# Patient Record
Sex: FEMALE | Race: WHITE | NOT HISPANIC OR LATINO
[De-identification: names, ages, dates, MRNs, and addresses within clinical notes are randomized per-mention and may not be internally consistent; named-entity substitution may affect disease eponyms.]

---
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
## 2020-04-14 NOTE — HISTORY OF PRESENT ILLNESS
[FreeTextEntry1] : 18 y/o female s/p bilateral breast reduction on 5/22/19. She is doing well and denies any issues or complaints.

## 2020-04-14 NOTE — ASSESSMENT
[FreeTextEntry1] : 20 y/o female s/p bilateral breast reduction on 5/22/19. She is doing well and denies any issues or complaints. \par Return to office in 2 weeks.\par

## 2020-04-14 NOTE — PHYSICAL EXAM
[NI] : Normal [de-identified] : bilateral breast incisions healing well. Soft, non-tender. No signs of infection.

## 2020-07-06 NOTE — H&P PST ADULT - NSCAGESTDRUGCUTDN_GEN_A_CORE_SD
28 year old female  EDC 20 at 40.6 weeks who presents from office  noted to have oligo   denied any lof vb  feels good fetal movements   state  GBS negative    EFW 7#   denied any ap issues denied any fetal issues     med hx denied   surg hx denied  NKDA   OB hx 2018 FT   7.6#
no

## 2020-07-09 ENCOUNTER — TRANSCRIPTION ENCOUNTER (OUTPATIENT)
Age: 20
End: 2020-07-09

## 2021-07-22 ENCOUNTER — TRANSCRIPTION ENCOUNTER (OUTPATIENT)
Age: 21
End: 2021-07-22

## 2021-10-19 ENCOUNTER — TRANSCRIPTION ENCOUNTER (OUTPATIENT)
Age: 21
End: 2021-10-19

## 2021-10-20 ENCOUNTER — EMERGENCY (EMERGENCY)
Facility: HOSPITAL | Age: 21
LOS: 1 days | Discharge: HOME | End: 2021-10-20
Attending: EMERGENCY MEDICINE
Payer: COMMERCIAL

## 2021-10-20 VITALS
RESPIRATION RATE: 18 BRPM | OXYGEN SATURATION: 96 % | TEMPERATURE: 99 F | SYSTOLIC BLOOD PRESSURE: 127 MMHG | DIASTOLIC BLOOD PRESSURE: 80 MMHG | WEIGHT: 164.02 LBS | HEART RATE: 120 BPM

## 2021-10-20 DIAGNOSIS — Z98.890 OTHER SPECIFIED POSTPROCEDURAL STATES: Chronic | ICD-10-CM

## 2021-10-20 LAB
HETEROPH AB TITR SER AGGL: POSITIVE
S PYO DNA THROAT QL NAA+PROBE: SIGNIFICANT CHANGE UP

## 2021-10-20 PROCEDURE — 36415 COLL VENOUS BLD VENIPUNCTURE: CPT

## 2021-10-20 PROCEDURE — 87651 STREP A DNA AMP PROBE: CPT

## 2021-10-20 PROCEDURE — 99283 EMERGENCY DEPT VISIT LOW MDM: CPT

## 2021-10-20 PROCEDURE — 87798 DETECT AGENT NOS DNA AMP: CPT

## 2021-10-20 PROCEDURE — 86308 HETEROPHILE ANTIBODY SCREEN: CPT

## 2021-10-20 NOTE — ED ADULT TRIAGE NOTE - CHIEF COMPLAINT QUOTE
Patient states that she has been having fevers x 10days. Pt states that she has had multiple different tests done but they are all negative. Tmax 103.9. Pt states that she took Advil at 5am. Pt c/o cough, sore throat and bilateral axillary pain

## 2021-10-20 NOTE — ED STATDOCS - WET READ LAUNCH FT
Detail Level: Detailed
Depth Of Biopsy: dermis
Was A Bandage Applied: Yes
Size Of Lesion In Cm: 0
Anticipated Plan (Based On Presumed Biopsy Results): If positive 15
Biopsy Type: H and E
Biopsy Method: 15 blade
Anesthesia Type: 1% lidocaine with 1:100,000 epinephrine
Anesthesia Volume In Cc (Will Not Render If 0): 0.5
Hemostasis: Aluminum Chloride
Wound Care: Vaseline
Dressing: Band-Aid
There are no Wet Read(s) to document.
Destruction After The Procedure: No
Type Of Destruction Used: Curettage
Curettage Text: The wound bed was treated with curettage after the biopsy was performed.
Cryotherapy Text: The wound bed was treated with cryotherapy after the biopsy was performed.
Electrodesiccation Text: The wound bed was treated with electrodesiccation after the biopsy was performed.
Electrodesiccation And Curettage Text: The wound bed was treated with electrodesiccation and curettage after the biopsy was performed.
Silver Nitrate Text: The wound bed was treated with silver nitrate after the biopsy was performed.
Lab: 6
Lab Facility: [id number]
Consent: Written consent was obtained and risks were reviewed including but not limited to scarring, infection, bleeding, scabbing, incomplete removal, nerve damage and allergy to anesthesia.
Post-Care Instructions: POST CARE: \\n- Your bandage should remain dry until the following morning. \\n- Clean the site each morning with soap and water. Apply Vaseline and a fresh bandaid each day until the site heals.
Notification Instructions: Patient instructed to call the office if not contacted with biopsy results within 2 weeks.
Billing Type: Third-Party Bill
Information: Selecting Yes will display possible errors in your note based on the variables you have selected. This validation is only offered as a suggestion for you. PLEASE NOTE THAT THE VALIDATION TEXT WILL BE REMOVED WHEN YOU FINALIZE YOUR NOTE. IF YOU WANT TO FAX A PRELIMINARY NOTE YOU WILL NEED TO TOGGLE THIS TO 'NO' IF YOU DO NOT WANT IT IN YOUR FAXED NOTE.

## 2021-10-20 NOTE — ED STATDOCS - PATIENT PORTAL LINK FT
You can access the FollowMyHealth Patient Portal offered by [facility] by registering at the following website: [email or web address]. By joining [employer]’s FollowMyHealth portal, you will also be able to view your health information using other applications (apps) compatible with our system.

## 2021-10-20 NOTE — ED STATDOCS - OBJECTIVE STATEMENT
22 y/o female with no PMHx c/o fever. Pt states she had fever of 103.8 at 05:00 took 2 Advil. Pt states she had negative rapid strep, mono, flu, and covid yesterday, also had PCR covid sent. Pt states is fully vaccinated against fever. Pt states starting 10 days ago she had sinus pressure, nasal congestion, ear pressure has been using nasal spray and OTC decongestant. Pt states her roommate had bronchitis prior her symptoms. Pt went back to [facility] 5 days ago due to continued fever and was given z-pack.

## 2021-10-20 NOTE — ED STATDOCS - NSFOLLOWUPINSTRUCTIONS_ED_ALL_ED_FT
Rest, lots of fluids  Increase Advil to 600 mg every 6 hrs with food and may alternate with ES Tylenol 1000 mg every 6hrs  Follow up next 2-3 days  Return sooner for any problems      Viral Illness, Pediatric  Viruses are tiny germs that can get into a person's body and cause illness. There are many different types of viruses, and they cause many types of illness. Viral illness in children is very common. A viral illness can cause fever, sore throat, cough, rash, or diarrhea. Most viral illnesses that affect children are not serious. Most go away after several days without treatment.    The most common types of viruses that affect children are:    Cold and flu viruses.  Stomach viruses.  Viruses that cause fever and rash. These include illnesses such as measles, rubella, roseola, fifth disease, and chicken pox.    What are the causes?  Many types of viruses can cause illness. Viruses invade cells in your child's body, multiply, and cause the infected cells to malfunction or die. When the cell dies, it releases more of the virus. When this happens, your child develops symptoms of the illness, and the virus continues to spread to other cells. If the virus takes over the function of the cell, it can cause the cell to divide and grow out of control, as is the case when a virus causes cancer.    Different viruses get into the body in different ways. Your child is most likely to catch a virus from being exposed to another person who is infected with a virus. This may happen at home, at school, or at . Your child may get a virus by:    Breathing in droplets that have been coughed or sneezed into the air by an infected person. Cold and flu viruses, as well as viruses that cause fever and rash, are often spread through these droplets.  Touching anything that has been contaminated with the virus and then touching his or her nose, mouth, or eyes. Objects can be contaminated with a virus if:    They have droplets on them from a recent cough or sneeze of an infected person.  They have been in contact with the vomit or stool (feces) of an infected person. Stomach viruses can spread through vomit or stool.    Eating or drinking anything that has been in contact with the virus.  Being bitten by an insect or animal that carries the virus.  Being exposed to blood or fluids that contain the virus, either through an open cut or during a transfusion.    What are the signs or symptoms?  Symptoms vary depending on the type of virus and the location of the cells that it invades. Common symptoms of the main types of viral illnesses that affect children include:    Cold and flu viruses     Fever.  Sore throat.  Aches and headache.  Stuffy nose.  Earache.  Cough.  Stomach viruses     Fever.  Loss of appetite.  Vomiting.  Stomachache.  Diarrhea.  Fever and rash viruses     Fever.  Swollen glands.  Rash.  Runny nose.  How is this treated?  Most viral illnesses in children go away within 3?10 days. In most cases, treatment is not needed. Your child's health care provider may suggest over-the-counter medicines to relieve symptoms.    A viral illness cannot be treated with antibiotic medicines. Viruses live inside cells, and antibiotics do not get inside cells. Instead, antiviral medicines are sometimes used to treat viral illness, but these medicines are rarely needed in children.    Many childhood viral illnesses can be prevented with vaccinations (immunization shots). These shots help prevent flu and many of the fever and rash viruses.    Follow these instructions at home:  Medicines     Give over-the-counter and prescription medicines only as told by your child's health care provider. Cold and flu medicines are usually not needed. If your child has a fever, ask the health care provider what over-the-counter medicine to use and what amount (dosage) to give.  Do not give your child aspirin because of the association with Reye syndrome.  If your child is older than 4 years and has a cough or sore throat, ask the health care provider if you can give cough drops or a throat lozenge.  Do not ask for an antibiotic prescription if your child has been diagnosed with a viral illness. That will not make your child's illness go away faster. Also, frequently taking antibiotics when they are not needed can lead to antibiotic resistance. When this develops, the medicine no longer works against the bacteria that it normally fights.  Eating and drinking     Image   If your child is vomiting, give only sips of clear fluids. Offer sips of fluid frequently. Follow instructions from your child's health care provider about eating or drinking restrictions.  If your child is able to drink fluids, have the child drink enough fluid to keep his or her urine clear or pale yellow.  General instructions     Make sure your child gets a lot of rest.  If your child has a stuffy nose, ask your child's health care provider if you can use salt-water nose drops or spray.  If your child has a cough, use a cool-mist humidifier in your child's room.  If your child is older than 1 year and has a cough, ask your child's health care provider if you can give teaspoons of honey and how often.  Keep your child home and rested until symptoms have cleared up. Let your child return to normal activities as told by your child's health care provider.  Keep all follow-up visits as told by your child's health care provider. This is important.  How is this prevented?  ImageTo reduce your child's risk of viral illness:    Teach your child to wash his or her hands often with soap and water. If soap and water are not available, he or she should use hand .  Teach your child to avoid touching his or her nose, eyes, and mouth, especially if the child has not washed his or her hands recently.  If anyone in the household has a viral infection, clean all household surfaces that may have been in contact with the virus. Use soap and hot water. You may also use diluted bleach.  Keep your child away from people who are sick with symptoms of a viral infection.  Teach your child to not share items such as toothbrushes and water bottles with other people.  Keep all of your child's immunizations up to date.  Have your child eat a healthy diet and get plenty of rest.    Contact a health care provider if:  Your child has symptoms of a viral illness for longer than expected. Ask your child's health care provider how long symptoms should last.  Treatment at home is not controlling your child's symptoms or they are getting worse.  Get help right away if:  Your child who is younger than 3 months has a temperature of 100°F (38°C) or higher.  Your child has vomiting that lasts more than 24 hours.  Your child has trouble breathing.  Your child has a severe headache or has a stiff neck.  This information is not intended to replace advice given to you by your health care provider. Make sure you discuss any questions you have with your health care provider.

## 2021-10-20 NOTE — ED STATDOCS - PROGRESS NOTE DETAILS
Strep,. RVP, and monospot sent.  Encouraged pt to increase Advil to 600 mg every 6 hrs and alternate with Tylenol and f/u as outpt

## 2021-10-21 NOTE — ED POST DISCHARGE NOTE - ADDITIONAL DOCUMENTATION
Spoke with PT advised + mono and no athletics. RVP status shows "pending collection". PT will return to ED today for RVP

## 2021-10-22 ENCOUNTER — EMERGENCY (EMERGENCY)
Facility: HOSPITAL | Age: 21
LOS: 1 days | Discharge: HOME | End: 2021-10-22
Payer: COMMERCIAL

## 2021-10-22 VITALS
HEIGHT: 65 IN | WEIGHT: 164.91 LBS | HEART RATE: 103 BPM | TEMPERATURE: 99 F | DIASTOLIC BLOOD PRESSURE: 62 MMHG | OXYGEN SATURATION: 97 % | RESPIRATION RATE: 18 BRPM | SYSTOLIC BLOOD PRESSURE: 113 MMHG

## 2021-10-22 DIAGNOSIS — Z98.890 OTHER SPECIFIED POSTPROCEDURAL STATES: Chronic | ICD-10-CM

## 2021-10-22 LAB
RAPID RVP RESULT: DETECTED
RV+EV RNA SPEC QL NAA+PROBE: DETECTED
SARS-COV-2 RNA SPEC QL NAA+PROBE: SIGNIFICANT CHANGE UP

## 2021-10-22 PROCEDURE — 99284 EMERGENCY DEPT VISIT MOD MDM: CPT

## 2021-10-22 PROCEDURE — 0225U NFCT DS DNA&RNA 21 SARSCOV2: CPT

## 2021-10-22 PROCEDURE — 99283 EMERGENCY DEPT VISIT LOW MDM: CPT

## 2021-10-22 NOTE — ED PROVIDER NOTE - NSICDXPASTMEDICALHX_GEN_ALL_CORE_FT
PAST MEDICAL HISTORY:  Cervicalgia     Dorsalgia     Hypertrophy of breast     No pertinent past medical history

## 2021-10-22 NOTE — ED ADULT TRIAGE NOTE - CHIEF COMPLAINT QUOTE
pt states that she was her x2 days and was called to come back because she had to retake "a test" unsure which test pt states that she was here x2 days and was called to come back because she had to retake "a test" unsure which test

## 2021-10-22 NOTE — ED PROVIDER NOTE - PATIENT PORTAL LINK FT
You can access the FollowMyHealth Patient Portal offered by [facility] by registering at the following website: [email or web address]. By joining [facility]’s FollowMyHealth portal, you will also be able to view your health information using other applications (apps) compatible with our system.

## 2021-10-22 NOTE — ED PROVIDER NOTE - OBJECTIVE STATEMENT
pt was eval in Ed and dx with mono, presents for repeat RVP swab. Complaining of dry cough, denies SOB/wheezing/CP.

## 2021-10-23 PROBLEM — Z78.9 OTHER SPECIFIED HEALTH STATUS: Chronic | Status: ACTIVE | Noted: 2021-10-20

## 2022-02-11 NOTE — ED STATDOCS - ENMT, MLM
Your presenting symptoms of right wrist pain for the last 4 to 5 days are likely nonspecific sprain versus tendinitis related.  X-ray right wrist done here on initial review is negative for any acute findings.  Recommend supportive care for now.  Wrist splint is placed for support.  May continue taking over-the-counter ibuprofen 600 mg with meals every 8 hours as needed for pain.  Advised to rest, avoid repeat wrist movements or doing any strenuous activity till symptoms resolve, advance activity gradually as tolerated.  Follow with primary care physician or come back if not better in few days or sooner if worsening      Patient Education     Wrist Sprain  A sprain is an injury to the ligaments or capsule that holds a joint together. There are no broken bones. Most sprains take about 3 to 6 weeks to heal. If it a severe sprain where the ligament is completely torn, it can take months to recover.  Most wrist sprains are treated with a splint, wrist brace, or elastic wrap for support. Severe sprains may require surgery.  Home care  · Keep your arm elevated to reduce pain and swelling. This is very important during the first 48 hours.  · Apply an ice pack over the injured area for 15 to 20 minutes every 3 to 6 hours. You should do this for the first 24 to 48 hours. You can make an ice pack by filling a plastic bag that seals at the top with ice cubes and then wrapping it with a thin towel. Continue to use ice packs for relief of pain and swelling as needed. As the ice melts, be careful to avoid getting your wrap, splint, or cast wet. After 48 hours, apply heat (warm shower or warm bath) for 15 to 20 minutes several times a day, or alternate ice and heat.   · You may use over-the-counter pain medicine to control pain, unless another pain medicine was prescribed. If you have chronic liver or kidney disease or ever had a stomach ulcer or gastrointestinal bleeding, talk with your doctor before using these medicines.  · If  you were given a splint or brace, wear it for the time advised by your doctor.  Follow-up care  Follow up with your healthcare provider, or as advised. Any X-rays you had today don’t show any broken bones, breaks, or fractures. Sometimes fractures don’t show up on the first X-ray. Bruises and sprains can sometimes hurt as much as a fracture. These injuries can take time to heal completely. If your symptoms don’t improve or they get worse, talk with your doctor. You may need a repeat X-ray. If X-rays were taken, you will be told of any new findings that may affect your care.  When to seek medical advice  Call your healthcare provider right away if any of these occur:  · Pain or swelling increases  · Fingers or hand becomes cold, blue, numb, or tingly   Melisa last reviewed this educational content on 5/1/2018  © 3095-1864 [employer]. All rights reserved. This information is not intended as a substitute for professional medical care. Always follow your healthcare professional's instructions.            posterior cervical adenopathy, enlarged tonsil no exudates mildly erythematous

## 2022-05-20 ENCOUNTER — TRANSCRIPTION ENCOUNTER (OUTPATIENT)
Age: 22
End: 2022-05-20

## 2022-05-21 ENCOUNTER — TRANSCRIPTION ENCOUNTER (OUTPATIENT)
Age: 22
End: 2022-05-21

## 2022-05-21 ENCOUNTER — INPATIENT (INPATIENT)
Facility: HOSPITAL | Age: 22
LOS: 0 days | Discharge: HOME | DRG: 743 | End: 2022-05-21
Attending: OBSTETRICS & GYNECOLOGY | Admitting: OBSTETRICS & GYNECOLOGY
Payer: COMMERCIAL

## 2022-05-21 ENCOUNTER — RESULT REVIEW (OUTPATIENT)
Age: 22
End: 2022-05-21

## 2022-05-21 VITALS
RESPIRATION RATE: 16 BRPM | HEART RATE: 85 BPM | OXYGEN SATURATION: 100 % | DIASTOLIC BLOOD PRESSURE: 60 MMHG | SYSTOLIC BLOOD PRESSURE: 119 MMHG

## 2022-05-21 VITALS
DIASTOLIC BLOOD PRESSURE: 71 MMHG | HEART RATE: 83 BPM | SYSTOLIC BLOOD PRESSURE: 117 MMHG | HEIGHT: 65 IN | RESPIRATION RATE: 22 BRPM | OXYGEN SATURATION: 100 %

## 2022-05-21 DIAGNOSIS — Z98.890 OTHER SPECIFIED POSTPROCEDURAL STATES: Chronic | ICD-10-CM

## 2022-05-21 DIAGNOSIS — N83.519 TORSION OF OVARY AND OVARIAN PEDICLE, UNSPECIFIED SIDE: ICD-10-CM

## 2022-05-21 LAB
ALBUMIN SERPL ELPH-MCNC: 4.8 G/DL — SIGNIFICANT CHANGE UP (ref 3.3–5.2)
ALP SERPL-CCNC: 75 U/L — SIGNIFICANT CHANGE UP (ref 40–120)
ALT FLD-CCNC: 49 U/L — HIGH
ANION GAP SERPL CALC-SCNC: 18 MMOL/L — HIGH (ref 5–17)
APTT BLD: 26.8 SEC — LOW (ref 27.5–35.5)
AST SERPL-CCNC: 36 U/L — HIGH
BASOPHILS # BLD AUTO: 0.02 K/UL — SIGNIFICANT CHANGE UP (ref 0–0.2)
BASOPHILS NFR BLD AUTO: 0.2 % — SIGNIFICANT CHANGE UP (ref 0–2)
BILIRUB SERPL-MCNC: 0.5 MG/DL — SIGNIFICANT CHANGE UP (ref 0.4–2)
BLD GP AB SCN SERPL QL: SIGNIFICANT CHANGE UP
BUN SERPL-MCNC: 17.7 MG/DL — SIGNIFICANT CHANGE UP (ref 8–20)
CALCIUM SERPL-MCNC: 9.4 MG/DL — SIGNIFICANT CHANGE UP (ref 8.6–10.2)
CHLORIDE SERPL-SCNC: 100 MMOL/L — SIGNIFICANT CHANGE UP (ref 98–107)
CO2 SERPL-SCNC: 20 MMOL/L — LOW (ref 22–29)
CREAT SERPL-MCNC: 0.67 MG/DL — SIGNIFICANT CHANGE UP (ref 0.5–1.3)
EGFR: 127 ML/MIN/1.73M2 — SIGNIFICANT CHANGE UP
EOSINOPHIL # BLD AUTO: 0.03 K/UL — SIGNIFICANT CHANGE UP (ref 0–0.5)
EOSINOPHIL NFR BLD AUTO: 0.2 % — SIGNIFICANT CHANGE UP (ref 0–6)
GLUCOSE SERPL-MCNC: 131 MG/DL — HIGH (ref 70–99)
HCG SERPL-ACNC: <4 MIU/ML — SIGNIFICANT CHANGE UP
HCT VFR BLD CALC: 42 % — SIGNIFICANT CHANGE UP (ref 34.5–45)
HGB BLD-MCNC: 14.5 G/DL — SIGNIFICANT CHANGE UP (ref 11.5–15.5)
IMM GRANULOCYTES NFR BLD AUTO: 0.5 % — SIGNIFICANT CHANGE UP (ref 0–1.5)
INR BLD: 1.2 RATIO — HIGH (ref 0.88–1.16)
LIDOCAIN IGE QN: 20 U/L — LOW (ref 22–51)
LYMPHOCYTES # BLD AUTO: 1.14 K/UL — SIGNIFICANT CHANGE UP (ref 1–3.3)
LYMPHOCYTES # BLD AUTO: 9.3 % — LOW (ref 13–44)
MCHC RBC-ENTMCNC: 29.1 PG — SIGNIFICANT CHANGE UP (ref 27–34)
MCHC RBC-ENTMCNC: 34.5 GM/DL — SIGNIFICANT CHANGE UP (ref 32–36)
MCV RBC AUTO: 84.2 FL — SIGNIFICANT CHANGE UP (ref 80–100)
MONOCYTES # BLD AUTO: 0.75 K/UL — SIGNIFICANT CHANGE UP (ref 0–0.9)
MONOCYTES NFR BLD AUTO: 6.1 % — SIGNIFICANT CHANGE UP (ref 2–14)
NEUTROPHILS # BLD AUTO: 10.22 K/UL — HIGH (ref 1.8–7.4)
NEUTROPHILS NFR BLD AUTO: 83.7 % — HIGH (ref 43–77)
PLATELET # BLD AUTO: 221 K/UL — SIGNIFICANT CHANGE UP (ref 150–400)
POTASSIUM SERPL-MCNC: 3.4 MMOL/L — LOW (ref 3.5–5.3)
POTASSIUM SERPL-SCNC: 3.4 MMOL/L — LOW (ref 3.5–5.3)
PROT SERPL-MCNC: 7.2 G/DL — SIGNIFICANT CHANGE UP (ref 6.6–8.7)
PROTHROM AB SERPL-ACNC: 14 SEC — HIGH (ref 10.5–13.4)
RAPID RVP RESULT: SIGNIFICANT CHANGE UP
RBC # BLD: 4.99 M/UL — SIGNIFICANT CHANGE UP (ref 3.8–5.2)
RBC # FLD: 12.2 % — SIGNIFICANT CHANGE UP (ref 10.3–14.5)
SARS-COV-2 RNA SPEC QL NAA+PROBE: SIGNIFICANT CHANGE UP
SODIUM SERPL-SCNC: 138 MMOL/L — SIGNIFICANT CHANGE UP (ref 135–145)
WBC # BLD: 12.22 K/UL — HIGH (ref 3.8–10.5)
WBC # FLD AUTO: 12.22 K/UL — HIGH (ref 3.8–10.5)

## 2022-05-21 PROCEDURE — 80053 COMPREHEN METABOLIC PANEL: CPT

## 2022-05-21 PROCEDURE — 76830 TRANSVAGINAL US NON-OB: CPT | Mod: 26

## 2022-05-21 PROCEDURE — 86850 RBC ANTIBODY SCREEN: CPT

## 2022-05-21 PROCEDURE — 99285 EMERGENCY DEPT VISIT HI MDM: CPT

## 2022-05-21 PROCEDURE — 88305 TISSUE EXAM BY PATHOLOGIST: CPT | Mod: 26

## 2022-05-21 PROCEDURE — 0225U NFCT DS DNA&RNA 21 SARSCOV2: CPT

## 2022-05-21 PROCEDURE — 58661 LAPAROSCOPY REMOVE ADNEXA: CPT | Mod: GC

## 2022-05-21 PROCEDURE — 84702 CHORIONIC GONADOTROPIN TEST: CPT

## 2022-05-21 PROCEDURE — 99285 EMERGENCY DEPT VISIT HI MDM: CPT | Mod: 25

## 2022-05-21 PROCEDURE — 76830 TRANSVAGINAL US NON-OB: CPT

## 2022-05-21 PROCEDURE — 88305 TISSUE EXAM BY PATHOLOGIST: CPT

## 2022-05-21 PROCEDURE — 86901 BLOOD TYPING SEROLOGIC RH(D): CPT

## 2022-05-21 PROCEDURE — 85025 COMPLETE CBC W/AUTO DIFF WBC: CPT

## 2022-05-21 PROCEDURE — 83690 ASSAY OF LIPASE: CPT

## 2022-05-21 PROCEDURE — 76856 US EXAM PELVIC COMPLETE: CPT | Mod: 26

## 2022-05-21 PROCEDURE — 96375 TX/PRO/DX INJ NEW DRUG ADDON: CPT

## 2022-05-21 PROCEDURE — 96374 THER/PROPH/DIAG INJ IV PUSH: CPT

## 2022-05-21 PROCEDURE — 76856 US EXAM PELVIC COMPLETE: CPT

## 2022-05-21 PROCEDURE — 36415 COLL VENOUS BLD VENIPUNCTURE: CPT

## 2022-05-21 PROCEDURE — 86900 BLOOD TYPING SEROLOGIC ABO: CPT

## 2022-05-21 RX ORDER — KETOROLAC TROMETHAMINE 30 MG/ML
15 SYRINGE (ML) INJECTION ONCE
Refills: 0 | Status: DISCONTINUED | OUTPATIENT
Start: 2022-05-21 | End: 2022-05-21

## 2022-05-21 RX ORDER — MORPHINE SULFATE 50 MG/1
4 CAPSULE, EXTENDED RELEASE ORAL ONCE
Refills: 0 | Status: DISCONTINUED | OUTPATIENT
Start: 2022-05-21 | End: 2022-05-21

## 2022-05-21 RX ORDER — HYDROMORPHONE HYDROCHLORIDE 2 MG/ML
0.5 INJECTION INTRAMUSCULAR; INTRAVENOUS; SUBCUTANEOUS
Refills: 0 | Status: DISCONTINUED | OUTPATIENT
Start: 2022-05-21 | End: 2022-05-21

## 2022-05-21 RX ORDER — ONDANSETRON 8 MG/1
4 TABLET, FILM COATED ORAL ONCE
Refills: 0 | Status: COMPLETED | OUTPATIENT
Start: 2022-05-21 | End: 2022-05-21

## 2022-05-21 RX ORDER — IBUPROFEN 200 MG
1 TABLET ORAL
Qty: 16 | Refills: 0
Start: 2022-05-21 | End: 2022-05-24

## 2022-05-21 RX ORDER — SODIUM CHLORIDE 9 MG/ML
1000 INJECTION, SOLUTION INTRAVENOUS
Refills: 0 | Status: DISCONTINUED | OUTPATIENT
Start: 2022-05-21 | End: 2022-05-21

## 2022-05-21 RX ORDER — ACETAMINOPHEN 500 MG
2 TABLET ORAL
Qty: 32 | Refills: 0
Start: 2022-05-21 | End: 2022-05-24

## 2022-05-21 RX ORDER — ACETAMINOPHEN 500 MG
1000 TABLET ORAL ONCE
Refills: 0 | Status: COMPLETED | OUTPATIENT
Start: 2022-05-21 | End: 2022-05-21

## 2022-05-21 RX ADMIN — MORPHINE SULFATE 4 MILLIGRAM(S): 50 CAPSULE, EXTENDED RELEASE ORAL at 03:58

## 2022-05-21 RX ADMIN — HYDROMORPHONE HYDROCHLORIDE 0.5 MILLIGRAM(S): 2 INJECTION INTRAMUSCULAR; INTRAVENOUS; SUBCUTANEOUS at 08:20

## 2022-05-21 RX ADMIN — HYDROMORPHONE HYDROCHLORIDE 0.5 MILLIGRAM(S): 2 INJECTION INTRAMUSCULAR; INTRAVENOUS; SUBCUTANEOUS at 08:41

## 2022-05-21 RX ADMIN — Medication 15 MILLIGRAM(S): at 04:02

## 2022-05-21 RX ADMIN — Medication 400 MILLIGRAM(S): at 08:39

## 2022-05-21 RX ADMIN — ONDANSETRON 4 MILLIGRAM(S): 8 TABLET, FILM COATED ORAL at 08:45

## 2022-05-21 RX ADMIN — MORPHINE SULFATE 4 MILLIGRAM(S): 50 CAPSULE, EXTENDED RELEASE ORAL at 02:46

## 2022-05-21 RX ADMIN — MORPHINE SULFATE 4 MILLIGRAM(S): 50 CAPSULE, EXTENDED RELEASE ORAL at 05:10

## 2022-05-21 RX ADMIN — ONDANSETRON 4 MILLIGRAM(S): 8 TABLET, FILM COATED ORAL at 02:46

## 2022-05-21 RX ADMIN — ONDANSETRON 4 MILLIGRAM(S): 8 TABLET, FILM COATED ORAL at 05:10

## 2022-05-21 NOTE — ED PROVIDER NOTE - CLINICAL SUMMARY MEDICAL DECISION MAKING FREE TEXT BOX
Pt with known hx of ovarian cyst. Concern for torsion. Will obtain stat US, likely GYN consult, pain control, reassess.

## 2022-05-21 NOTE — BRIEF OPERATIVE NOTE - NSICDXBRIEFPROCEDURE_GEN_ALL_CORE_FT
PROCEDURES:  Exploratory laparoscopy 21-May-2022 05:18:47  Aileen Fair   PROCEDURES:  Exploratory laparoscopy 21-May-2022 05:18:47  Aileen Fair  Laparoscopic right ovarian cystectomy 21-May-2022 07:48:07  Aileen Fair  Laparoscopic partial right salpingectomy 21-May-2022 07:48:19  Aileen Fair

## 2022-05-21 NOTE — ED ADULT NURSE NOTE - NS ED NURSE RECORD ANOTHER HT AND WT
Please contact number below to schedule MRI, Carotid Doppler.  Central/Diagnostic Scheduling  [phone]     (ultrasounds/MRI/CT scan/diabe educ/mammo)    In regards to your Echocardiogram  Cardiac will obtain prior authorization of the needed procedure. After this is approved by insurance you will receive a call to schedule an appointment.       For your reference the telephone number to cardiac scheduling is: [phone] Monday-Friday 8a-430pm.  (This scheduling department schedules for Sunny)     For referral to Pulmonology.  PULMONOLOGY  [address]  [address]  [place] [address] [phone]      NEUROLOGY  [address]  [address]  [place] [address] [phone]       Yes

## 2022-05-21 NOTE — ED PROVIDER NOTE - OBJECTIVE STATEMENT
23 y/o female with a PMHx of ovarian cyst, presents to the ED c/o RLQ pain, worsening for the past 2 hours. Associated with n/v. Pt had hx of similar pain 1 month ago, resolved on its own, but then the pain returned a few days ago intermittently. Pt had a followup outpatient transvaginal US today at 11am at Dr. Coburn's office and was found to have an 8cm R ovarian cyst. Reports severe RLQ pain for the past 2 hours. Denies chance of pregnancy. LMP 4/23.

## 2022-05-21 NOTE — ED ADULT NURSE NOTE - NSIMPLEMENTINTERV_GEN_ALL_ED
Implemented All Fall Risk Interventions:  [place] to call system. Call bell, personal items and telephone within reach. Instruct patient to call for assistance. Room bathroom lighting operational. Non-slip footwear when patient is off stretcher. Physically safe environment: no spills, clutter or unnecessary equipment. Stretcher in lowest position, wheels locked, appropriate side rails in place. Provide visual cue, wrist band, yellow gown, etc. Monitor gait and stability. Monitor for mental status changes and reorient to person, place, and time. Review medications for side effects contributing to fall risk. Reinforce activity limits and safety measures with patient and family.

## 2022-05-21 NOTE — BRIEF OPERATIVE NOTE - NSICDXBRIEFPOSTOP_GEN_ALL_CORE_FT
POST-OP DIAGNOSIS:  Torsion of right ovary and ovarian pedicle 21-May-2022 07:48:31  Aileen Fair  Right ovarian cyst 21-May-2022 07:48:25  Aileen Fair

## 2022-05-21 NOTE — ASU DISCHARGE PLAN (ADULT/PEDIATRIC) - NS MD DC FALL RISK RISK
For information on Fall & Injury Prevention, visit: [email or web address] OR  [email or web address] OR  https://www.cdc.gov/steadi/patient.html

## 2022-05-21 NOTE — ASU DISCHARGE PLAN (ADULT/PEDIATRIC) - PROCEDURE
exploratory laparoscopy exploratory laparoscopy, right ovarian cystectomy, partial right salpingectomy

## 2022-05-21 NOTE — ED ADULT NURSE REASSESSMENT NOTE - NS ED NURSE REASSESS COMMENT FT1
pt resting comfortably in bed showing no signs of respiratory distress or pain, pt is calm and cooperative, mother at bedside, MD Lund at bedside

## 2022-05-21 NOTE — BRIEF OPERATIVE NOTE - NSICDXBRIEFPREOP_GEN_ALL_CORE_FT
PRE-OP DIAGNOSIS:  Torsion of right ovary and ovarian pedicle 21-May-2022 05:19:00  Aileen Fair  Right ovarian cyst 21-May-2022 05:19:12  Aileen Fair   PRE-OP DIAGNOSIS:  Right ovarian cyst 21-May-2022 05:19:12  Aileen Fair  Torsion of right ovary and ovarian pedicle 21-May-2022 05:19:00  Aileen Fair

## 2022-05-21 NOTE — ASU DISCHARGE PLAN (ADULT/PEDIATRIC) - CARE PROVIDER_API CALL
Mushtaq Coburn (DO)  Obstetrics [place] [address]  Phone: [phone]  Fax: [phone]  Follow Up Time: 2 weeks

## 2022-05-21 NOTE — H&P ADULT - ASSESSMENT
A/P: 22y G0 with irregular menstrual periods presents RLQ pain since 12 midnight, admitted for suspicion of ovarian torsion    Pt with RLQ pain since midnight not relieved with pain medications, physical exam and presentation with high suspicion for ovarian torsion  TVUS with 9.9cm right ovarian cyst  Consent  Admit for emergent detorsion  COVID obtained in ED

## 2022-05-21 NOTE — ED PROVIDER NOTE - IV ALTEPLASE EXCL REL HIDDEN
[facility]   MD KRISTEN MARQUES MD KRISTINE SOLTANPOUR, ALBIN ABRAMS    TEL:  OFFICE: [phone]  From 5pm-7am Answering Service [phone]    -- RENAL FOLLOW UP NOTE ---Date of Service 05-17-22 @ 12:42    Patient is a 86y old  Female who presents with a chief complaint of ams and dysuria (17 May 2022 12:39)      Patient seen and examined at bedside. No chest pain/sob    VITALS:  T(F): 98.6 (05-17-22 @ 05:17), Max: 98.6 (05-16-22 @ 20:44)  HR: 84 (05-17-22 @ 05:17)  BP: 127/61 (05-17-22 @ 05:17)  RR: 17 (05-17-22 @ 05:17)  SpO2: 96% (05-17-22 @ 05:17)  Wt(kg): --        PHYSICAL EXAM:  Constitutional: NAD  Neck: No JVD  Respiratory: CTAB, no wheezes, rales or rhonchi  Cardiovascular: S1, S2, RRR  Gastrointestinal: BS+, soft, NT/ND  Extremities: No peripheral edema    Hospital Medications:   MEDICATIONS  (STANDING):  allopurinol 100 milliGRAM(s) Oral at bedtime  cefTRIAXone   IVPB 1000 milliGRAM(s) IV Intermittent every 24 hours  enoxaparin Injectable 40 milliGRAM(s) SubCutaneous every 24 hours  fenofibrate Tablet 145 milliGRAM(s) Oral at bedtime  losartan 50 milliGRAM(s) Oral daily  metoprolol succinate ER 25 milliGRAM(s) Oral daily  metroNIDAZOLE    Tablet 500 milliGRAM(s) Oral two times a day  mirtazapine 15 milliGRAM(s) Oral <User Schedule>  pantoprazole    Tablet 40 milliGRAM(s) Oral before breakfast  thiamine 100 milliGRAM(s) Oral daily      LABS:  05-17    135  |  100  |  12  ----------------------------<  80  4.3   |  24  |  0.90    Ca    10.7<H>      17 May 2022 07:15  Phos  2.4     05-17  Mg     2.00     05-17    TPro  7.1  /  Alb  3.7  /  TBili  0.3  /  DBili      /  AST  18  /  ALT  15  /  AlkPhos  61  05-17    Creatinine Trend: 0.90 <--, 0.85 <--, 0.89 <--, 0.93 <--, 0.94 <--, 0.92 <--, 0.87 <--, 0.80 <--    Albumin, Serum: 3.7 g/dL (05-17 @ 07:15)  Phosphorus Level, Serum: 2.4 mg/dL (05-17 @ 07:15)  Vitamin D, 25-Hydroxy: 53.8 ng/mL (05-17 @ 07:15)                              13.5   5.39  )-----------( 326      ( 17 May 2022 07:15 )             43.5     Urine Studies:  Urinalysis - [05-04-22 @ 12:06]      Color Light Yellow / Appearance Clear / SG 1.006 / pH 6.5      Gluc Negative / Ketone Negative  / Bili Negative / Urobili <2 mg/dL       Blood Negative / Protein Negative / Leuk Est Negative / Nitrite Negative      RBC  / WBC  / Hyaline  / Gran  / Sq Epi  / Non Sq Epi  / Bacteria     Urine Sodium <20      [05-15-22 @ 21:00]  Urine Osmolality 178      [05-15-22 @ 21:00]    PTH -- (Ca --)      [05-04-22 @ 16:19]   29  PTH -- (Ca --)      [05-04-22 @ 12:06]   31  Vitamin D (25OH) 53.8      [05-17-22 @ 07:15]  TSH 0.71      [05-04-22 @ 10:43]      Free Light Chains: kappa 4.05, lambda 5.44, ratio = 0.74      [05-05 @ 04:37]  Immunofixation Serum:   No Monoclonal Band Identified  Britt Marshall M.D.    Reference Range: None Detected      [05-04-22 @ 16:19]  SPEP Interpretation: Hypoalbuminemia with increased Alpha-1 fraction consistent with acute  phase reaction.  Increased Beta fraction, possibly transferrin increase..  Britt Marshall M.D.      [05-04-22 @ 16:19]    RADIOLOGY & ADDITIONAL STUDIES:   show

## 2022-05-21 NOTE — ASU DISCHARGE PLAN (ADULT/PEDIATRIC) - BATHING
**Physician Signature**  This document was electronically signed by: Flaquita Freedman DO  2021   12:55 PM    **Consult Information**  [facility] Facility: [address] [place] [address] Drive MRN: [id number]  Visit/Encounter Number: [id number]  Consult ID: [id number]  Facility Time Zone: CT  Date and Time of Request: 2021 06:30 AM  Requesting Clinician: Shubham Correa MD  Patient Name: Jessica Springer  YOB: 1942  Gender: Male  Patient identity was confirmed at the beginning of the consult with the   patient/family/staff using two personal identifiers: Patient name and       **Admission**  Admission Date: 2021  Chief reason for ICU admission: Respiratory Failure  Secondary reasons for ICU admission: Altered Mental Status    **Core Metrics**  General orienting sentence for patient: 65 y/o transferred from an outside   hospital. Found down at home, had missed HD (ESRD). Intubated and on the   ventilator. Was also hypoglycemic. Only on Versed. Recently started on   insulin. . On hep drip for a fib. HD today. Starting   card drip. Cardizem gtt along with Gtt heparin. Anoxic CNS injury suspected. Off all   sedation and remains comatose. Intermittent hypoglycemia, requiring D10. HX   ESRD with right arm AVF. Son is in [facility], coming tomorrow   for ? change in status. Has hx AICD/pacer. + corneal, + over breathes   vent. diltiazem gtt. Not interactive. but not interactive. Cardizem at 5   mg/hr. Gtt heparin. BP reasonable, 30%   FIO2 slowing. MRI is planned for Monday. Concern for anoxic or hypoglycemic   encephalopathy. today then a family meeting based on results. Chief physiologic deterioration: None - Stable patient  Is the patient on DVT prophylaxis?: No  DVT Prophylaxis Comments: hep drip  Is the patient on GI prophylaxis?: No  Gi Prophylaxis Comments: Should be on pepcid 20 or protonix 40.   Has this patient reached their nutritional goal?: Yes  Nutritional Goals Details: TF  Are there current issues with pain management in this patient?:   No  Are there issues with skin integrity?: No  Are there issues with delirium?: No  Has the patient been mobilized?: No  Is this patient currently intubated?: Yes  Are there ethical or care philosophy or family issues?: No  Do you recommend an in depth evaluation?: No  Do you recommend the patient should: : Continue ICU level of care    **Inserted/Removed Devices**  Device Name: Endotracheal Tube  Site of insertion: Trachea  Date of insertion: 05-  Device Name: Gomes Catheter  Site of insertion: Bladder  Date of insertion: 05-  Device Name: Orogastric Tube  Site of insertion: Oropharynx  Date of insertion: 05- No change

## 2022-05-21 NOTE — H&P ADULT - NSHPLABSRESULTS_GEN_ALL_CORE
LABS:                        14.5   12.22 )-----------( 221      ( 21 May 2022 03:04 )             42.0     05-21    138  |  100  |  17.7  ----------------------------<  131<H>  3.4<L>   |  20.0<L>  |  0.67    Ca    9.4      21 May 2022 03:04    TPro  7.2  /  Alb  4.8  /  TBili  0.5  /  DBili  x   /  AST  36<H>  /  ALT  49<H>  /  AlkPhos  75  05-21          RADIOLOGY STUDIES:  < from: US Transvaginal (05.21.22 @ 03:29) >      ACC: [id number] EXAM:  US PELVIC COMPLETE                        ACC: [id number] EXAM:  US TRANSVAGINAL                          PROCEDURE DATE:  05/21/2022          INTERPRETATION:  CLINICAL INFORMATION: Right pelvic pain for 2 hours.    LMP: 04/23/2022    COMPARISON: None available.    TECHNIQUE:  Endovaginal and transabdominal pelvic sonogram. Color and Spectral   Doppler was performed.  Technically limited study.    FINDINGS:  Uterus: 6.7 cm x 3.8 cm x 5.8 cm. Within normal limits.  Endometrium: 12 mm. Within normal limits.    Right ovary: 10.3 cm x 9.2 cm x 9.9 cm. a 9.9 x 8.3 x 9.5 cm cyst with   complex echoes is suggestive complex hemorrhagic cyst. Abnormal but   present blood flow flow to the ovary was demonstrated.  Left ovary: 3.1 cmx 1.6 cm x 2.3 cm. Within normal limits.    Fluid: Small pelvic free fluid.    IMPRESSION:  9.9 cm complex right hemorrhagic ovarian cyst.  Blood flow to both ovaries was demonstrated. However, intermittent   torsion is not excluded. Clinical correlation necessary.    --- End of Report ---            MARVA MEREDITH MD; Attending Radiologist  This document has been electronically signed. May 21 2022  3:50AM    < end of copied text >

## 2022-05-21 NOTE — H&P ADULT - NSHPPHYSICALEXAM_GEN_ALL_CORE
Vital Signs Last 24 Hrs  T(C): --  T(F): --  HR: 83 (21 May 2022 01:54) (83 - 83)  BP: 117/71 (21 May 2022 01:54) (117/71 - 117/71)  BP(mean): --  RR: 22 (21 May 2022 01:54) (22 - 22)  SpO2: 100% (21 May 2022 01:54) (100% - 100%)     PHYSICAL EXAM:  CHEST/LUNG: Clear to percussion bilaterally; No rales, rhonchi, wheezing, or rubs  HEART: Regular rate and rhythm; No murmurs, rubs, or gallops  ABDOMEN: Soft, tender to palpation RLQ, Nondistended; Bowel sounds present  EXTREMITIES:  2+ Peripheral Pulses, No clubbing, cyanosis, or edema  PELVIC: deferred

## 2022-05-21 NOTE — BRIEF OPERATIVE NOTE - OPERATION/FINDINGS
enlarged simple cyst approximately 10 cm, right fallopian tube was adhered to the right ovarian cyst, grossly normal uterus, grossly normal uterus and cervix

## 2022-05-21 NOTE — ED ADULT NURSE NOTE - CHPI ED NUR SYMPTOMS POS
In order to meet Medicare requirements, the clinical documentation must support the information cited in the admission order.  Please be sure to provide detailed and clear documentation about the following in the admitting note/history and physical: NAUSEA/PAIN/PELVIC PAIN/VOMITING

## 2022-05-21 NOTE — ED PROVIDER NOTE - PHYSICAL EXAMINATION
Vital Signs per nursing documentation  Gen: Uncomfortable appearing, no acute distress  HEENT: NCAT, MMM  Cardiac: regular rate rhythm, normal S1S2  Chest: clear to auscultation bilateral, no wheezes or crackles  Abdomen: soft, non distended, (+)RLQ TTP.   Extremity: no gross deformity, good perfusion  Skin: no rash  Neuro: nonfocal neuro exam, gait steady Vital Signs per nursing documentation  Gen: Uncomfortable appearing  HEENT: NCAT, MMM  Cardiac: regular rate rhythm, normal S1S2  Chest: clear to auscultation bilateral, no wheezes or crackles  Abdomen: soft, non distended, (+)RLQ TTP.   Extremity: no gross deformity, good perfusion  Skin: no rash  Neuro: nonfocal neuro exam, gait steady

## 2022-05-21 NOTE — H&P ADULT - HISTORY OF PRESENT ILLNESS
HPI: 22y G0 with irregular menstrual periods presents RLQ pain since 12 midnight. Pt has a hx of known R ovarian cyst and was seen by NP at office of Dr. Sheppard and had a TVUS today showing 8cm cyst on right ovary. At that time she did not endorse pain but her pain suddenly worsened at midnight and was described as sharp stabbing with radiation to R lower back and right thigh. She also endorsed nausea with one episode of vomiting at home and once in ED. She is s/p morphine without relief. TVUS in ED confirms 9.9cm R ovarian cyst with abnormal but present flow.    PMH: denies  PSH: b/l breast reduction  OB: G0  GYN: R ovarian cyst, denies STD, STIs, irregular periods  SOCIAL: denies  Allergies: NKDA    No Known Allergies    Intolerances  MEDS: denies

## 2022-05-21 NOTE — ED ADULT NURSE NOTE - OBJECTIVE STATEMENT
Assumed care of pt at 0200. Pt A&Ox4 c/o 10/10 RLQ pain that started at midnight, the pt states the pain would come and go but at 12 pm it was worse than ever, pt also c/o N/V that started around 12 pm, pt has a history of right ovarian cyst, mother at bedside, labs drawn at bedside and pain medication given, pt now resting comfortably in bed showing no signs of respiratory distress or pain

## 2022-05-21 NOTE — H&P ADULT - ATTENDING COMMENTS
22y G0 with irregular menstrual periods presents RLQ pain since 12 midnight, admitted for suspicion of ovarian torsion with pain not controlled by IV medications. R/B/A of diagnostic laparoscopy and treatment of ovarian cyst including possible salpingectomy, oophorectomy, laparotomy discussed. Patient plans to start oral contraceptives with her next period.

## 2022-05-21 NOTE — ED PROVIDER NOTE - PROGRESS NOTE DETAILS
GYN called for consult given concerning story. patient at US now seen by gyn, admit for OR for concern for torsion

## 2022-05-26 LAB — SURGICAL PATHOLOGY STUDY: SIGNIFICANT CHANGE UP

## 2023-03-16 NOTE — ED ADULT NURSE NOTE - NSSEPSISSUSPECTED_ED_A_ED
CC:SEE TECH NOTES/ REVIEWED AND AGREE/PRIOR HX DM CATARACTS AND BROW PTOSIS /VA SEEMS MAYBE A BIT WORSE          KERATOMETRY:       Right eye:       Left eye:    Risks and benefits of dilation discussed with the patient  ASSUME RETINOSCOPY SAME AS MR UNLESS OTHERWISE NOTED  PATIENT ALERT AND ORIENTED TO TIME AND PLACE AND APPROPRIATE MOOD        Assessment:  CATARACT both    Plan:  Discussed with patient natural course and treatment of Cataracts      Assessment:  DIABETES    Plan:  No diabetic eye disease, Discussed risk of blindness with pt, pt to call immediately with any changes , Discussed tighter control of diabetes, and importance of hemoglobin A1c control ,in decreasing risk of blindness due to diabetes and No clinically significant macular edema    Assessment:  Presbyopia     Plan: Per Final Rx in the Smart Form  SOOTHE XP  WATCH CD       No

## 2024-02-24 ENCOUNTER — NON-APPOINTMENT (OUTPATIENT)
Age: 24
End: 2024-02-24

## 2024-03-05 NOTE — ED PROVIDER NOTE - CONSTITUTIONAL DEVELOPMENT, MLM
Rx Refill Note  Requested Prescriptions     Pending Prescriptions Disp Refills    metFORMIN ER (GLUCOPHAGE-XR) 500 MG 24 hr tablet [Pharmacy Med Name: metFORMIN HCl  MG Oral Tablet Extended Release 24 Hour] 180 tablet 3     Sig: TAKE 2 TABLETS BY MOUTH DAILY  WITH BREAKFAST      Last office visit with prescribing clinician: 6/19/2023   Last telemedicine visit with prescribing clinician: Visit date not found   Next office visit with prescribing clinician: 4/15/2024       Alvina Stone MA  03/05/24, 15:18 EST  
well developed

## 2024-03-24 ENCOUNTER — NON-APPOINTMENT (OUTPATIENT)
Age: 24
End: 2024-03-24

## 2024-07-15 ENCOUNTER — APPOINTMENT (OUTPATIENT)
Dept: ORTHOPEDIC SURGERY | Facility: CLINIC | Age: 24
End: 2024-07-15

## 2024-07-15 DIAGNOSIS — M25.851 OTHER SPECIFIED JOINT DISORDERS, RIGHT HIP: ICD-10-CM

## 2024-07-15 DIAGNOSIS — Z00.00 ENCOUNTER FOR GENERAL ADULT MEDICAL EXAMINATION W/OUT ABNORMAL FINDINGS: ICD-10-CM

## 2024-07-15 DIAGNOSIS — S73.191A OTHER SPRAIN OF RIGHT HIP, INITIAL ENCOUNTER: ICD-10-CM

## 2024-07-15 PROCEDURE — 73502 X-RAY EXAM HIP UNI 2-3 VIEWS: CPT

## 2024-07-15 PROCEDURE — 99202 OFFICE O/P NEW SF 15 MIN: CPT | Mod: 25

## 2024-07-24 ENCOUNTER — APPOINTMENT (OUTPATIENT)
Dept: ORTHOPEDIC SURGERY | Facility: CLINIC | Age: 24
End: 2024-07-24

## 2024-07-25 ENCOUNTER — NON-APPOINTMENT (OUTPATIENT)
Age: 24
End: 2024-07-25

## 2024-07-29 ENCOUNTER — APPOINTMENT (OUTPATIENT)
Dept: ORTHOPEDIC SURGERY | Facility: CLINIC | Age: 24
End: 2024-07-29

## 2024-07-31 ENCOUNTER — RESULT REVIEW (OUTPATIENT)
Age: 24
End: 2024-07-31

## 2024-08-05 ENCOUNTER — APPOINTMENT (OUTPATIENT)
Dept: ORTHOPEDIC SURGERY | Facility: CLINIC | Age: 24
End: 2024-08-05

## 2024-08-08 ENCOUNTER — APPOINTMENT (OUTPATIENT)
Dept: ORTHOPEDIC SURGERY | Facility: CLINIC | Age: 24
End: 2024-08-08

## 2024-08-08 PROCEDURE — 99203 OFFICE O/P NEW LOW 30 MIN: CPT

## 2024-08-08 NOTE — DATA REVIEWED
[Outside X-rays] : outside x-rays [MRI] : MRI [Right] : of the right [Hip] : hip [I independently reviewed and interpreted images and report] : I independently reviewed and interpreted images and report [FreeTextEntry1] : Cam lesion with alpha >50 [FreeTextEntry2] : Ant/sup labral tear, cam deformity with alpha >50

## 2024-08-08 NOTE — IMAGING
[de-identified] : General: NAD, A&Ox3 Gait: Non-antalgic, no Trendelenburg Foot Progression: neutral Knee Progression: neutral   R Hip Exam: Pain with Log Roll - negative Flexion: 110 Pain with Hyperflexion - yes FADIR - pos SHALA - neg Extension: 20 Flexion Contracture - none Prone Apprehension Test - neg Prone Rotation Test - symmetric Ischial tenderness - none Trochanteric tenderness - none   Abduction - 4 /5, pain - yes Adduction - 5 /5 Supine resisted SLR - 5 /5, pain - no Seated resisted hip flexion - 5 /5, pain - no Dorsiflexion 5/5 Plantarflexion 5/5 EHL 5/5 DP/PT 2+, foot is warm and well perfused        Leg Lengths: symmetric

## 2024-08-08 NOTE — HISTORY OF PRESENT ILLNESS
[de-identified] : Gale العراقي is a 24-year-old female here with RIGHT hip pain.  Pain has been present since May 2024 and is located in the groin area. Rates pain as 8/10 on pain scale.  Injury - No specific cause of injury/ trauma Mechanical symptoms - Clicking and popping  Exacerbating factors/activities/positions - Bending and running. Pain during or after activity:  Both  Back pain- None  Radicular pain - None   Previous Treatment: None, Dr. Kong referral / MRI @  NSAIDs: None PT:  None Activity Mods:  None Surgery: None   Injections:  None Date of last injection:  Numbing phase relief:  Steroid phase relief:  Occupation:  Teacher Sports/Recreational Activities: [ ]

## 2024-08-08 NOTE — DISCUSSION/SUMMARY
[de-identified] : HANS PLATT has R hip impingement.  They have not yet failed non-operative treatment which includes:     1.  NSAIDs - these help to calm inflammation in your hip and can relieve pain.  They should be taken as directed and with food to help avoid an upset stomach.  Consult with your primary care physician if there is any concern over whether NSAIDs are compatible with your other medications or medical conditions.   2.  Physical Therapy - physical therapy helps to build up the muscles around your hip joint which helps to stabilize the joint and your pelvis and can relieve pain.  Physical therapy will also help to improve your hip mobility and strengthen your core muscles which help to stabilize your pelvis and subsequently your hip joint.   3.  Activity Modifications - if possible, avoiding activities and positions that cause hip pain can help to reduce inflammation in your hip joint and reduce hip pain   4.  Injections - occasionally a steroid injection into your hip joint can be used to help relieve pain.  I will see him back in 6 weeks for recheck. Their diagnosis was explained to him in terms of taking understanding and they are in agreement with this plan.  All of their questions were answered.    Briefly discussed hip arthroscopy today   The patient's current medication management of their orthopedic diagnosis was reviewed today: (1) We discussed a comprehensive treatment plan that included possible pharmaceutical management involving the use of prescription strength medications including but not limited to options such as oral Naprosyn 500mg BID, once daily Meloxicam 15 mg, or 500-650 mg Tylenol versus over the counter oral medications and topical prescription NSAID Pennsaid vs over the counter Voltaren gel. (2) There is a moderate risk of morbidity with further treatment, especially from use of prescription strength medications and possible side effects of these medications which include upset stomach with oral medications, skin reactions to topical medications and cardiac/renal issues with long term use. (3) I recommended that the patient follow-up with their medical physician to discuss any significant specific potential issues with long term medication use such as interactions with current medications or with exacerbation of underlying medical comorbidities. (4) The benefits and risks associated with use of injectable, oral or topical, prescription and over the counter anti-inflammatory medications were discussed with the patient. The patient voiced understanding of the risks including but not limited to bleeding, stroke, kidney dysfunction, heart disease, and were referred to the black box warning label for further information.   Prior to appointment and during encounter with patient extensive medical records were reviewed including but not limited to, hospital records, out patient records, imaging results, and lab data. During this appointment the patient was examined, diagnoses were discussed and explained in a face to face manner. In addition extensive time was spent reviewing aforementioned diagnostic studies. Counseling including abnormal image results, differential diagnoses, treatment options, risk and benefits, lifestyle changes, current condition, and current medications was performed. Patient's comments, questions, and concerns were address and patient verbalized understanding.

## 2024-09-19 ENCOUNTER — APPOINTMENT (OUTPATIENT)
Dept: ORTHOPEDIC SURGERY | Facility: CLINIC | Age: 24
End: 2024-09-19

## 2024-11-15 ENCOUNTER — NON-APPOINTMENT (OUTPATIENT)
Age: 24
End: 2024-11-15

## 2025-01-02 ENCOUNTER — APPOINTMENT (OUTPATIENT)
Dept: ORTHOPEDIC SURGERY | Facility: CLINIC | Age: 25
End: 2025-01-02
Payer: COMMERCIAL

## 2025-01-02 DIAGNOSIS — M25.851 OTHER SPECIFIED JOINT DISORDERS, RIGHT HIP: ICD-10-CM

## 2025-01-02 PROCEDURE — 99214 OFFICE O/P EST MOD 30 MIN: CPT

## 2025-02-20 ENCOUNTER — APPOINTMENT (OUTPATIENT)
Dept: ORTHOPEDIC SURGERY | Facility: CLINIC | Age: 25
End: 2025-02-20

## 2025-03-03 ENCOUNTER — NON-APPOINTMENT (OUTPATIENT)
Age: 25
End: 2025-03-03

## 2025-03-18 ENCOUNTER — OFFICE (OUTPATIENT)
Dept: URBAN - METROPOLITAN AREA CLINIC 115 | Facility: CLINIC | Age: 25
Setting detail: OPHTHALMOLOGY
End: 2025-03-18
Payer: COMMERCIAL

## 2025-03-18 DIAGNOSIS — B88.0: ICD-10-CM

## 2025-03-18 DIAGNOSIS — H00.12: ICD-10-CM

## 2025-03-18 DIAGNOSIS — H01.001: ICD-10-CM

## 2025-03-18 DIAGNOSIS — H01.004: ICD-10-CM

## 2025-03-18 PROCEDURE — 92002 INTRM OPH EXAM NEW PATIENT: CPT | Performed by: OPHTHALMOLOGY

## 2025-03-18 ASSESSMENT — REFRACTION_AUTOREFRACTION
OS_CYLINDER: -0.75
OS_AXIS: 019
OD_SPHERE: -0.25
OD_CYLINDER: -1.50
OD_AXIS: 166
OS_SPHERE: 0.00

## 2025-03-18 ASSESSMENT — VISUAL ACUITY
OD_BCVA: 20/20
OS_BCVA: 20/20

## 2025-03-18 ASSESSMENT — CONFRONTATIONAL VISUAL FIELD TEST (CVF)
OD_FINDINGS: FULL
OS_FINDINGS: FULL

## 2025-03-18 ASSESSMENT — LID EXAM ASSESSMENTS
OD_BLEPHARITIS: RUL T 1+
OS_BLEPHARITIS: LUL T 1+

## 2025-03-18 ASSESSMENT — TONOMETRY
OD_IOP_MMHG: 15
OS_IOP_MMHG: 16

## 2025-04-21 ENCOUNTER — OFFICE (OUTPATIENT)
Dept: URBAN - METROPOLITAN AREA CLINIC 115 | Facility: CLINIC | Age: 25
Setting detail: OPHTHALMOLOGY
End: 2025-04-21
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
## 2025-04-21 PROCEDURE — NO SHOW FE NO SHOW FEE: Performed by: OPHTHALMOLOGY

## 2025-06-17 ENCOUNTER — NON-APPOINTMENT (OUTPATIENT)
Age: 25
End: 2025-06-17

## 2025-06-20 ENCOUNTER — NON-APPOINTMENT (OUTPATIENT)
Age: 25
End: 2025-06-20

## (undated) DEVICE — DRAPE TOWEL BLUE 17" X 24"

## (undated) DEVICE — TROCAR COVIDIEN VERSAPORT OPTICAL BLADELESS 11MM STD

## (undated) DEVICE — CANNULA COVIDIEN VERSAONE UNIVERSAL STANDARD 5MM

## (undated) DEVICE — GLV 7 PROTEXIS

## (undated) DEVICE — SOL IRR POUR NS 0.9% 1000ML

## (undated) DEVICE — POSITIONER PINK PAD PIGAZZI SYSTEM

## (undated) DEVICE — ELCTR CORD FOOTSWITCH 1PLR LAPSCP 10FT

## (undated) DEVICE — PNEUMOPERITONEUM NDL -GYN

## (undated) DEVICE — TROCAR COVIDIEN VERSAONE OPTICAL BLADELESS 5MM

## (undated) DEVICE — BLADE SURGICAL #15 CARBON

## (undated) DEVICE — FOLEY TRAY 16FR 5CC LF UMETER CLOSED

## (undated) DEVICE — SYR LUER LOK 10CC

## (undated) DEVICE — PACK GENERAL LAPAROSCOPY

## (undated) DEVICE — SUT MONOCRYL 4-0 18" PS-2

## (undated) DEVICE — BLANKET WARMER UPPER ADULT

## (undated) DEVICE — ENDOCATCH 10MM SPECIMEN POUCH

## (undated) DEVICE — LIGASURE BLUNT TIP NANO CTD 37CM

## (undated) DEVICE — WRAP COMPRESSION CALF MED

## (undated) DEVICE — GELPOINT MINI ADVANCED

## (undated) DEVICE — UTERINE MANIPULATOR THOMAS MEDICAL 4.5MM

## (undated) DEVICE — SOL IRR POUR H2O 1000ML

## (undated) DEVICE — TUBING STRYKEFLOW II SUCTION / IRRIGATOR